# Patient Record
Sex: FEMALE | Race: WHITE | NOT HISPANIC OR LATINO | Employment: PART TIME | URBAN - METROPOLITAN AREA
[De-identification: names, ages, dates, MRNs, and addresses within clinical notes are randomized per-mention and may not be internally consistent; named-entity substitution may affect disease eponyms.]

---

## 2023-10-19 ENCOUNTER — OFFICE VISIT (OUTPATIENT)
Dept: OBGYN CLINIC | Facility: CLINIC | Age: 43
End: 2023-10-19

## 2023-10-19 VITALS
WEIGHT: 241.8 LBS | BODY MASS INDEX: 47.47 KG/M2 | DIASTOLIC BLOOD PRESSURE: 70 MMHG | HEIGHT: 60 IN | SYSTOLIC BLOOD PRESSURE: 102 MMHG

## 2023-10-19 DIAGNOSIS — Z01.419 ENCOUNTER FOR GYNECOLOGICAL EXAMINATION WITHOUT ABNORMAL FINDING: Primary | ICD-10-CM

## 2023-10-19 DIAGNOSIS — Z12.31 ENCOUNTER FOR SCREENING MAMMOGRAM FOR MALIGNANT NEOPLASM OF BREAST: ICD-10-CM

## 2023-10-19 DIAGNOSIS — Q78.0 OSTEOGENESIS IMPERFECTA: ICD-10-CM

## 2023-10-19 DIAGNOSIS — Z12.4 SCREENING FOR CERVICAL CANCER: ICD-10-CM

## 2023-10-19 RX ORDER — LORATADINE 10 MG/1
CAPSULE, LIQUID FILLED ORAL
COMMUNITY

## 2023-10-19 RX ORDER — EZETIMIBE 10 MG
TABLET ORAL
COMMUNITY

## 2023-10-19 RX ORDER — ESOMEPRAZOLE MAGNESIUM 40 MG/1
40 CAPSULE, DELAYED RELEASE ORAL
COMMUNITY

## 2023-10-19 RX ORDER — SERTRALINE HYDROCHLORIDE 25 MG/1
50 TABLET, FILM COATED ORAL DAILY
COMMUNITY

## 2023-10-19 RX ORDER — SERTRALINE HCL 100 MG
TABLET ORAL
COMMUNITY

## 2023-10-19 RX ORDER — CELECOXIB 200 MG/1
CAPSULE ORAL
COMMUNITY
Start: 2023-08-23

## 2023-10-19 RX ORDER — BUDESONIDE AND FORMOTEROL FUMARATE DIHYDRATE 160; 4.5 UG/1; UG/1
AEROSOL RESPIRATORY (INHALATION)
COMMUNITY

## 2023-10-19 NOTE — PATIENT INSTRUCTIONS
Pap every3- 5 years if normal, sexually transmitted infection testing as indicated, exercise most days of week, obtain appropriate diet and hydration, Calcium 1000mg + 600 vit D daily, birth control as directed (ACHES reviewed). Benefits, risks and alternatives discussed/reviewed. HPV 9 vaccine recommended through age 39. Check with your insurance for coverage. If covered, call office to schedule start of vaccine series. Annual mammogram starting at age 36, monthly breast self exam. Nikos Oneil 20 times twice daily. Report cycles less than 21 d from d one to day one, lasting greater than 10 d or no menses in 3 mo.

## 2023-10-19 NOTE — PROGRESS NOTES
[address]  [address] [facility], [address], [place], [address] [facility]    ASSESSMENT/PLAN: Aura Schirmer is a 36 y.o.  who presents for annual gynecologic exam.    Encounter for routine gynecologic examination  - Routine well woman exam completed today. - Cervical Cancer Screening: Current ASCCP Guidelines reviewed. Last Pap: Not on file . Next Pap Due: today   - STI screening offered including HIV testing: Declined  - Contraceptive counseling discussed. Current contraception: none:  tubal   - Breast Cancer Screening: Last Mammogram Not on file,    order  given   dexa scan normal       Additional problems addressed during this visit:  1. Encounter for screening mammogram for malignant neoplasm of breast  -     Mammo screening bilateral w 3d & cad; Future        CC:  Annual Gynecologic Examination    HPI: Aura Schirmer is a 36 y.o.  who presents for annual gynecologic examination. 35 yo here for wellness  exam .      The following portions of the patient's history were reviewed and updated as appropriate: She  has a past medical history of Asthma, History of screening mammography (10/2022), and Osteogenesis imperfecta. She  has a past surgical history that includes  section; Cholecystectomy; and Quadriceps tendon repair (Right, ). Her family history includes Asthma in her brother; Cancer in her mother; Deep vein thrombosis in her father; Hyperlipidemia in her father. She  reports that she has never smoked. She has never used smokeless tobacco. She reports current alcohol use. She reports that she does not use drugs.   Current Outpatient Medications   Medication Sig Dispense Refill   • celecoxib (CeleBREX) 200 mg capsule      • esomeprazole (NexIUM) 40 MG capsule Take 40 mg by mouth     • Loratadine (Claritin) 10 MG CAPS      • sertraline (ZOLOFT) 25 mg tablet Take 50 mg by mouth daily     • Symbicort 160-4.5 MCG/ACT inhaler      • Zetia 10 MG tablet      • Zoloft 100 MG tablet        No current facility-administered medications for this visit. She is allergic to pseudoephedrine, sulfa antibiotics, and iodinated contrast media. .    Review of Systems      Objective:  /70   Ht 5' (1.524 m)   Wt 110 kg (241 lb 12.8 oz)   LMP 10/04/2023 (Exact Date)   Breastfeeding No   BMI 47.22 kg/m²    Physical Exam

## 2023-10-25 LAB
CYTOLOGIST CVX/VAG CYTO: NORMAL
DX ICD CODE: NORMAL
HPV GENOTYPE REFLEX: NORMAL
HPV I/H RISK 4 DNA CVX QL PROBE+SIG AMP: NEGATIVE
OTHER STN SPEC: NORMAL
PATH REPORT.FINAL DX SPEC: NORMAL
SL AMB NOTE:: NORMAL
SL AMB SPECIMEN ADEQUACY: NORMAL
SL AMB TEST METHODOLOGY: NORMAL